# Patient Record
Sex: MALE | Race: WHITE | Employment: FULL TIME | URBAN - METROPOLITAN AREA
[De-identification: names, ages, dates, MRNs, and addresses within clinical notes are randomized per-mention and may not be internally consistent; named-entity substitution may affect disease eponyms.]

---

## 2017-02-03 PROCEDURE — 83883 ASSAY NEPHELOMETRY NOT SPEC: CPT | Performed by: ORTHOPAEDIC SURGERY

## 2017-02-03 PROCEDURE — 86334 IMMUNOFIX E-PHORESIS SERUM: CPT | Performed by: ORTHOPAEDIC SURGERY

## 2017-02-03 PROCEDURE — 84165 PROTEIN E-PHORESIS SERUM: CPT | Performed by: ORTHOPAEDIC SURGERY

## 2017-08-29 ENCOUNTER — APPOINTMENT (OUTPATIENT)
Dept: GENERAL RADIOLOGY | Facility: HOSPITAL | Age: 57
End: 2017-08-29
Attending: EMERGENCY MEDICINE
Payer: OTHER MISCELLANEOUS

## 2017-08-29 ENCOUNTER — HOSPITAL ENCOUNTER (EMERGENCY)
Facility: HOSPITAL | Age: 57
Discharge: HOME | End: 2017-08-29
Attending: EMERGENCY MEDICINE
Payer: OTHER MISCELLANEOUS

## 2017-08-29 VITALS
OXYGEN SATURATION: 100 % | BODY MASS INDEX: 23.1 KG/M2 | HEART RATE: 78 BPM | DIASTOLIC BLOOD PRESSURE: 80 MMHG | RESPIRATION RATE: 18 BRPM | SYSTOLIC BLOOD PRESSURE: 138 MMHG | WEIGHT: 180 LBS | HEIGHT: 74 IN | TEMPERATURE: 98 F

## 2017-08-29 DIAGNOSIS — IMO0001 COMPRESSION FRACTURE OF VERTEBRA, INITIAL ENCOUNTER: Primary | ICD-10-CM

## 2017-08-29 PROCEDURE — 99284 EMERGENCY DEPT VISIT MOD MDM: CPT

## 2017-08-29 PROCEDURE — 72072 X-RAY EXAM THORAC SPINE 3VWS: CPT | Performed by: EMERGENCY MEDICINE

## 2017-08-29 RX ORDER — IBUPROFEN 600 MG/1
600 TABLET ORAL ONCE
Status: COMPLETED | OUTPATIENT
Start: 2017-08-29 | End: 2017-08-29

## 2017-08-29 RX ORDER — CYCLOBENZAPRINE HCL 10 MG
10 TABLET ORAL 3 TIMES DAILY PRN
Qty: 20 TABLET | Refills: 0 | Status: SHIPPED | OUTPATIENT
Start: 2017-08-29 | End: 2017-09-05

## 2017-08-29 RX ORDER — HYDROCODONE BITARTRATE AND ACETAMINOPHEN 5; 325 MG/1; MG/1
1-2 TABLET ORAL EVERY 4 HOURS PRN
Qty: 20 TABLET | Refills: 0 | Status: SHIPPED | OUTPATIENT
Start: 2017-08-29 | End: 2017-09-05

## 2017-08-29 NOTE — ED INITIAL ASSESSMENT (HPI)
Pt states he was lifting at work today and experienced right sided back pain. Reports hx of bulging disc. Denies loss of bowel or bladder control. No meds PTA.

## 2017-08-29 NOTE — ED PROVIDER NOTES
Patient Seen in: [facility] Emergency Department    History   Patient presents with:  Back Pain (musculoskeletal)    Stated Complaint: employee work injury    HPI    Patient is a 14-year-old male who states today around 5 PM he was at work up on a l Relation Age of Onset   • cardiac pacemaker [Other] [OTHER] Father    • Hypertension Father        Smoking status: Never Smoker                                                              Smokeless tobacco: Never Used                      Alcohol use: Yes display  Thoracic spine x-rayCONCLUSION:    1. Wedge shaped deformity/height loss in the region of T7. The acuity is unknown.  Correlate with exam. Discussed with Dr. Nerissa Harman.        ============================================================  ED Course  ---

## 2017-08-30 ENCOUNTER — OFFICE VISIT (OUTPATIENT)
Dept: OCCUPATIONAL MEDICINE | Age: 57
End: 2017-08-30
Attending: PHYSICIAN ASSISTANT
Payer: OTHER MISCELLANEOUS

## 2017-09-01 ENCOUNTER — HOSPITAL ENCOUNTER (OUTPATIENT)
Dept: MRI IMAGING | Age: 57
Discharge: HOME | End: 2017-09-01
Attending: PREVENTIVE MEDICINE
Payer: OTHER MISCELLANEOUS

## 2017-09-01 DIAGNOSIS — M54.6 ACUTE THORACIC BACK PAIN: ICD-10-CM

## 2017-09-01 PROCEDURE — 72146 MRI CHEST SPINE W/O DYE: CPT | Performed by: PREVENTIVE MEDICINE

## 2017-09-05 ENCOUNTER — OFFICE VISIT (OUTPATIENT)
Dept: OCCUPATIONAL MEDICINE | Age: 57
End: 2017-09-05
Attending: PHYSICIAN ASSISTANT
Payer: OTHER MISCELLANEOUS

## 2017-09-05 DIAGNOSIS — S29.019D THORACIC MYOFASCIAL STRAIN, SUBSEQUENT ENCOUNTER: Primary | ICD-10-CM

## 2017-09-14 PROBLEM — R13.19 OTHER DYSPHAGIA: Status: ACTIVE | Noted: 2017-09-14

## 2017-09-14 PROBLEM — R13.12 OROPHARYNGEAL DYSPHAGIA: Status: RESOLVED | Noted: 2017-09-14 | Resolved: 2017-09-14

## 2017-09-14 PROBLEM — R13.12 OROPHARYNGEAL DYSPHAGIA: Status: ACTIVE | Noted: 2017-09-14

## 2017-09-14 PROBLEM — D22.9 NUMEROUS MOLES: Status: ACTIVE | Noted: 2017-09-14

## 2017-10-27 PROBLEM — L82.1 SEBORRHEIC KERATOSES: Status: ACTIVE | Noted: 2017-10-27

## 2017-10-27 PROBLEM — L40.0 PLAQUE PSORIASIS: Status: ACTIVE | Noted: 2017-10-27

## 2017-12-14 PROBLEM — L81.8 ATYPICAL MELANOCYTIC HYPERPLASIA: Status: ACTIVE | Noted: 2017-12-14

## 2018-08-23 PROBLEM — M72.2 PLANTAR FASCIITIS: Status: ACTIVE | Noted: 2018-08-23

## 2018-11-19 PROBLEM — L20.82 FLEXURAL ECZEMA: Status: ACTIVE | Noted: 2018-11-19

## 2018-11-20 ENCOUNTER — CHARTING TRANS (OUTPATIENT)
Dept: OTHER | Age: 58
End: 2018-11-20

## 2018-11-24 PROCEDURE — 86803 HEPATITIS C AB TEST: CPT | Performed by: FAMILY MEDICINE

## 2018-12-07 VITALS — TEMPERATURE: 97.7 F

## 2018-12-27 PROBLEM — R39.13 INTERMITTENT URINARY STREAM: Status: ACTIVE | Noted: 2018-12-27

## 2018-12-27 PROBLEM — R39.9 LOWER URINARY TRACT SYMPTOMS (LUTS): Status: ACTIVE | Noted: 2018-12-27

## 2019-01-17 PROBLEM — Z87.448 HISTORY OF URETHRAL STRICTURE: Status: ACTIVE | Noted: 2019-01-17

## 2019-01-28 ENCOUNTER — WALK IN (OUTPATIENT)
Dept: URGENT CARE | Age: 59
End: 2019-01-28

## 2019-01-28 VITALS — TEMPERATURE: 97.9 F

## 2019-01-28 DIAGNOSIS — Z23 NEED FOR SHINGLES VACCINE: Primary | ICD-10-CM

## 2019-01-28 PROCEDURE — 90471 IMMUNIZATION ADMIN: CPT | Performed by: NURSE PRACTITIONER

## 2019-01-28 PROCEDURE — 90750 HZV VACC RECOMBINANT IM: CPT | Performed by: NURSE PRACTITIONER

## 2019-12-04 PROBLEM — M72.2 PLANTAR FASCIITIS: Status: RESOLVED | Noted: 2018-08-23 | Resolved: 2019-12-04

## 2019-12-04 PROBLEM — L20.82 FLEXURAL ECZEMA: Status: RESOLVED | Noted: 2018-11-19 | Resolved: 2019-12-04

## 2019-12-04 PROBLEM — R39.9 LOWER URINARY TRACT SYMPTOMS (LUTS): Status: RESOLVED | Noted: 2018-12-27 | Resolved: 2019-12-04

## 2019-12-04 PROBLEM — D22.9 NUMEROUS MOLES: Status: RESOLVED | Noted: 2017-09-14 | Resolved: 2019-12-04

## 2019-12-04 PROBLEM — R39.13 INTERMITTENT URINARY STREAM: Status: RESOLVED | Noted: 2018-12-27 | Resolved: 2019-12-04

## 2019-12-09 PROBLEM — R51.9 HEADACHE DISORDER: Status: ACTIVE | Noted: 2019-12-09

## 2019-12-09 PROBLEM — R42 DIZZINESS: Status: ACTIVE | Noted: 2019-12-09

## 2020-01-16 PROBLEM — R42 DIZZINESS: Status: RESOLVED | Noted: 2019-12-09 | Resolved: 2020-01-16

## 2020-01-18 PROBLEM — N18.30 CKD (CHRONIC KIDNEY DISEASE) STAGE 3, GFR 30-59 ML/MIN (HCC): Status: ACTIVE | Noted: 2020-01-18

## 2020-04-06 PROBLEM — K40.90 LEFT INGUINAL HERNIA: Status: ACTIVE | Noted: 2020-04-06

## (undated) NOTE — ED AVS SNAPSHOT
Gladys Concepcion   MRN: [id number]    Department:  [facility] Emergency Department   Date of Visit:  8/29/2017           Disclosure     Insurance plans vary and the physician(s) referred by the ER may not be covered by your plan.  Please contact your If you have been prescribed any medication(s), please fill your prescription right away and begin taking the medication(s) as directed    If the emergency physician has read X-rays, these will be re-interpreted by a radiologist.  If there is a significant